# Patient Record
Sex: FEMALE | Race: WHITE | NOT HISPANIC OR LATINO | Employment: UNEMPLOYED | URBAN - NONMETROPOLITAN AREA
[De-identification: names, ages, dates, MRNs, and addresses within clinical notes are randomized per-mention and may not be internally consistent; named-entity substitution may affect disease eponyms.]

---

## 2017-05-04 ENCOUNTER — OFFICE VISIT (OUTPATIENT)
Dept: OBSTETRICS AND GYNECOLOGY | Facility: CLINIC | Age: 32
End: 2017-05-04

## 2017-05-04 VITALS
DIASTOLIC BLOOD PRESSURE: 96 MMHG | SYSTOLIC BLOOD PRESSURE: 138 MMHG | HEIGHT: 62 IN | HEART RATE: 130 BPM | WEIGHT: 172 LBS | BODY MASS INDEX: 31.65 KG/M2

## 2017-05-04 DIAGNOSIS — N91.4 SECONDARY OLIGOMENORRHEA: ICD-10-CM

## 2017-05-04 DIAGNOSIS — N90.4 LICHEN SCLEROSUS ET ATROPHICUS OF THE VULVA: ICD-10-CM

## 2017-05-04 DIAGNOSIS — Z01.419 WELL WOMAN EXAM WITH ROUTINE GYNECOLOGICAL EXAM: Primary | ICD-10-CM

## 2017-05-04 DIAGNOSIS — R10.2 PELVIC PAIN: ICD-10-CM

## 2017-05-04 PROCEDURE — 88312 SPECIAL STAINS GROUP 1: CPT | Performed by: PATHOLOGY

## 2017-05-04 PROCEDURE — 87624 HPV HI-RISK TYP POOLED RSLT: CPT | Performed by: NURSE PRACTITIONER

## 2017-05-04 PROCEDURE — 56605 BIOPSY OF VULVA/PERINEUM: CPT | Performed by: NURSE PRACTITIONER

## 2017-05-04 PROCEDURE — 88312 SPECIAL STAINS GROUP 1: CPT | Performed by: NURSE PRACTITIONER

## 2017-05-04 PROCEDURE — 88305 TISSUE EXAM BY PATHOLOGIST: CPT | Performed by: NURSE PRACTITIONER

## 2017-05-04 PROCEDURE — 88305 TISSUE EXAM BY PATHOLOGIST: CPT | Performed by: PATHOLOGY

## 2017-05-04 PROCEDURE — 88300 SURGICAL PATH GROSS: CPT | Performed by: PATHOLOGY

## 2017-05-04 PROCEDURE — 88142 CYTOPATH C/V THIN LAYER: CPT | Performed by: NURSE PRACTITIONER

## 2017-05-04 PROCEDURE — 99385 PREV VISIT NEW AGE 18-39: CPT | Performed by: NURSE PRACTITIONER

## 2017-05-04 RX ORDER — CLOBETASOL PROPIONATE 0.5 MG/G
OINTMENT TOPICAL 2 TIMES DAILY
Qty: 30 G | Refills: 11 | Status: SHIPPED | OUTPATIENT
Start: 2017-05-04 | End: 2018-08-29 | Stop reason: SDUPTHER

## 2017-05-04 RX ORDER — LISDEXAMFETAMINE DIMESYLATE 60 MG/1
CAPSULE ORAL
Refills: 0 | COMMUNITY
Start: 2017-04-03 | End: 2018-09-08

## 2017-05-04 RX ORDER — BUSPIRONE HYDROCHLORIDE 5 MG/1
TABLET ORAL
Refills: 2 | COMMUNITY
Start: 2017-04-03 | End: 2018-09-08

## 2017-05-05 ENCOUNTER — APPOINTMENT (OUTPATIENT)
Dept: LAB | Facility: HOSPITAL | Age: 32
End: 2017-05-05

## 2017-05-05 LAB
ALBUMIN SERPL-MCNC: 4.8 G/DL (ref 3.4–4.8)
ALBUMIN/GLOB SERPL: 1.5 G/DL (ref 1.1–1.8)
ALP SERPL-CCNC: 80 U/L (ref 38–126)
ALT SERPL W P-5'-P-CCNC: 29 U/L (ref 9–52)
ANION GAP SERPL CALCULATED.3IONS-SCNC: 14 MMOL/L (ref 5–15)
ARTICHOKE IGE QN: 74 MG/DL (ref 1–129)
AST SERPL-CCNC: 23 U/L (ref 14–36)
BILIRUB SERPL-MCNC: 0.8 MG/DL (ref 0.2–1.3)
BUN BLD-MCNC: 12 MG/DL (ref 7–21)
BUN/CREAT SERPL: 17.6 (ref 7–25)
CALCIUM SPEC-SCNC: 9.2 MG/DL (ref 8.4–10.2)
CHLORIDE SERPL-SCNC: 102 MMOL/L (ref 95–110)
CHOLEST SERPL-MCNC: 176 MG/DL (ref 0–199)
CO2 SERPL-SCNC: 23 MMOL/L (ref 22–31)
CREAT BLD-MCNC: 0.68 MG/DL (ref 0.5–1)
DEPRECATED RDW RBC AUTO: 38.7 FL (ref 36.4–46.3)
ERYTHROCYTE [DISTWIDTH] IN BLOOD BY AUTOMATED COUNT: 12.4 % (ref 11.5–14.5)
FSH SERPL-ACNC: 4.6 MIU/ML
GFR SERPL CREATININE-BSD FRML MDRD: 101 ML/MIN/1.73 (ref 64–149)
GLOBULIN UR ELPH-MCNC: 3.3 GM/DL (ref 2.3–3.5)
GLUCOSE BLD-MCNC: 94 MG/DL (ref 60–100)
HBA1C MFR BLD: 5.04 % (ref 4–5.6)
HCT VFR BLD AUTO: 40.9 % (ref 35–45)
HDLC SERPL-MCNC: 35 MG/DL (ref 60–200)
HGB BLD-MCNC: 14.7 G/DL (ref 12–15.5)
LDLC/HDLC SERPL: ABNORMAL {RATIO} (ref 0–3.22)
LH SERPL-ACNC: 8.34 MIU/ML
MCH RBC QN AUTO: 30.9 PG (ref 26.5–34)
MCHC RBC AUTO-ENTMCNC: 35.9 G/DL (ref 31.4–36)
MCV RBC AUTO: 85.9 FL (ref 80–98)
PLATELET # BLD AUTO: 226 10*3/MM3 (ref 150–450)
PMV BLD AUTO: 10.4 FL (ref 8–12)
POTASSIUM BLD-SCNC: 4 MMOL/L (ref 3.5–5.1)
PROT SERPL-MCNC: 8.1 G/DL (ref 6.3–8.6)
RBC # BLD AUTO: 4.76 10*6/MM3 (ref 3.77–5.16)
SODIUM BLD-SCNC: 139 MMOL/L (ref 137–145)
T3 SERPL-MCNC: 131 NG/DL (ref 97–169)
T4 FREE SERPL-MCNC: 0.83 NG/DL (ref 0.78–2.19)
TRIGL SERPL-MCNC: 521 MG/DL (ref 20–199)
TSH SERPL DL<=0.05 MIU/L-ACNC: 3.3 MIU/ML (ref 0.46–4.68)
WBC NRBC COR # BLD: 5.21 10*3/MM3 (ref 3.2–9.8)

## 2017-05-05 PROCEDURE — 80053 COMPREHEN METABOLIC PANEL: CPT | Performed by: NURSE PRACTITIONER

## 2017-05-05 PROCEDURE — 85027 COMPLETE CBC AUTOMATED: CPT | Performed by: NURSE PRACTITIONER

## 2017-05-05 PROCEDURE — 36415 COLL VENOUS BLD VENIPUNCTURE: CPT | Performed by: NURSE PRACTITIONER

## 2017-05-05 PROCEDURE — 83525 ASSAY OF INSULIN: CPT | Performed by: NURSE PRACTITIONER

## 2017-05-05 PROCEDURE — 84480 ASSAY TRIIODOTHYRONINE (T3): CPT | Performed by: NURSE PRACTITIONER

## 2017-05-05 PROCEDURE — 84443 ASSAY THYROID STIM HORMONE: CPT | Performed by: NURSE PRACTITIONER

## 2017-05-05 PROCEDURE — 84146 ASSAY OF PROLACTIN: CPT | Performed by: NURSE PRACTITIONER

## 2017-05-05 PROCEDURE — 83002 ASSAY OF GONADOTROPIN (LH): CPT | Performed by: NURSE PRACTITIONER

## 2017-05-05 PROCEDURE — 83036 HEMOGLOBIN GLYCOSYLATED A1C: CPT | Performed by: NURSE PRACTITIONER

## 2017-05-05 PROCEDURE — 83001 ASSAY OF GONADOTROPIN (FSH): CPT | Performed by: NURSE PRACTITIONER

## 2017-05-05 PROCEDURE — 82670 ASSAY OF TOTAL ESTRADIOL: CPT | Performed by: NURSE PRACTITIONER

## 2017-05-05 PROCEDURE — 80061 LIPID PANEL: CPT | Performed by: NURSE PRACTITIONER

## 2017-05-05 PROCEDURE — 84439 ASSAY OF FREE THYROXINE: CPT | Performed by: NURSE PRACTITIONER

## 2017-05-05 PROCEDURE — 84144 ASSAY OF PROGESTERONE: CPT | Performed by: NURSE PRACTITIONER

## 2017-05-06 LAB
ESTRADIOL SERPL HS-MCNC: 80.6 PG/ML
INSULIN SERPL-ACNC: 28.9 UIU/ML (ref 2.6–24.9)
PROGEST SERPL-MCNC: <0.1 NG/ML
PROLACTIN SERPL-MCNC: 15.3 NG/ML (ref 4.8–23.3)

## 2017-05-08 ENCOUNTER — TELEPHONE (OUTPATIENT)
Dept: OBSTETRICS AND GYNECOLOGY | Facility: CLINIC | Age: 32
End: 2017-05-08

## 2017-05-08 LAB
LAB AP CASE REPORT: NORMAL
LAB AP CLINICAL INFORMATION: NORMAL
Lab: NORMAL
Lab: NORMAL
PATH REPORT.FINAL DX SPEC: NORMAL

## 2017-05-08 RX ORDER — MEDROXYPROGESTERONE ACETATE 10 MG/1
10 TABLET ORAL DAILY
Qty: 10 TABLET | Refills: 11 | Status: SHIPPED | OUTPATIENT
Start: 2017-05-08 | End: 2018-08-29 | Stop reason: SDUPTHER

## 2017-05-08 RX ORDER — METFORMIN HYDROCHLORIDE 500 MG/1
500 TABLET, EXTENDED RELEASE ORAL
Qty: 30 TABLET | Refills: 12 | Status: SHIPPED | OUTPATIENT
Start: 2017-05-08 | End: 2018-08-29 | Stop reason: SDUPTHER

## 2017-05-09 ENCOUNTER — TELEPHONE (OUTPATIENT)
Dept: OBSTETRICS AND GYNECOLOGY | Facility: CLINIC | Age: 32
End: 2017-05-09

## 2017-05-10 LAB
LAB AP CASE REPORT: NORMAL
LAB AP GYN ADDITIONAL INFORMATION: NORMAL
Lab: NORMAL
PATH INTERP SPEC-IMP: NORMAL
STAT OF ADQ CVX/VAG CYTO-IMP: NORMAL

## 2017-05-11 ENCOUNTER — TELEPHONE (OUTPATIENT)
Dept: OBSTETRICS AND GYNECOLOGY | Facility: CLINIC | Age: 32
End: 2017-05-11

## 2017-05-12 LAB — HPV I/H RISK 4 DNA CVX QL PROBE+SIG AMP: NEGATIVE

## 2018-06-18 RX ORDER — CLOBETASOL PROPIONATE 0.5 MG/G
OINTMENT TOPICAL
Qty: 30 G | Refills: 11 | OUTPATIENT
Start: 2018-06-18

## 2018-08-29 ENCOUNTER — OFFICE VISIT (OUTPATIENT)
Dept: OBSTETRICS AND GYNECOLOGY | Facility: CLINIC | Age: 33
End: 2018-08-29

## 2018-08-29 VITALS
WEIGHT: 172 LBS | DIASTOLIC BLOOD PRESSURE: 92 MMHG | BODY MASS INDEX: 31.65 KG/M2 | HEART RATE: 92 BPM | SYSTOLIC BLOOD PRESSURE: 120 MMHG | HEIGHT: 62 IN

## 2018-08-29 DIAGNOSIS — E88.81 INSULIN RESISTANCE: ICD-10-CM

## 2018-08-29 DIAGNOSIS — N91.4 SECONDARY OLIGOMENORRHEA: ICD-10-CM

## 2018-08-29 DIAGNOSIS — N90.4 LICHEN SCLEROSUS ET ATROPHICUS OF THE VULVA: Primary | ICD-10-CM

## 2018-08-29 DIAGNOSIS — E28.2 PCOS (POLYCYSTIC OVARIAN SYNDROME): ICD-10-CM

## 2018-08-29 PROCEDURE — 99214 OFFICE O/P EST MOD 30 MIN: CPT | Performed by: NURSE PRACTITIONER

## 2018-08-29 RX ORDER — MEDROXYPROGESTERONE ACETATE 10 MG/1
10 TABLET ORAL DAILY
Qty: 10 TABLET | Refills: 11 | Status: SHIPPED | OUTPATIENT
Start: 2018-08-29 | End: 2018-09-08

## 2018-08-29 RX ORDER — CLOBETASOL PROPIONATE 0.5 MG/G
OINTMENT TOPICAL
Qty: 30 G | Refills: 11 | Status: SHIPPED | OUTPATIENT
Start: 2018-08-29 | End: 2018-09-08

## 2018-08-29 RX ORDER — METFORMIN HYDROCHLORIDE 500 MG/1
500 TABLET, EXTENDED RELEASE ORAL
Qty: 30 TABLET | Refills: 12 | Status: SHIPPED | OUTPATIENT
Start: 2018-08-29 | End: 2021-01-19

## 2018-08-29 NOTE — PROGRESS NOTES
Subjective   Chanelle Blanc is a 32 y.o. female. Needs refills on clobetasol and metformin but stopped taking metformin a few months ago b/c she didn't understand why she was taking it to begin with.    LMP- August, prior to that she hadn't had a period since May. She cramps really bad for several weeks prior to starting a period but reports that the bleeding is normal flow for about 7 days.     Using clobetasol for LS every 4-5 days and reports no itching but still will experience occasional fissures.     Took metformin until Rx ran out and didn't worry about refilling it because she didn't understand it's purpose and wonders about that today.    Last pap- 17 normal with negative HPV      Gynecologic Exam   The patient's pertinent negatives include no genital itching, genital lesions, genital odor, genital rash, missed menses, pelvic pain, vaginal bleeding or vaginal discharge. Primary symptoms comment: Lichen Sclerosus. This is a chronic problem. The current episode started more than 1 year ago. The problem occurs daily. The problem has been resolved. The patient is experiencing no pain. The problem affects both sides. Associated symptoms include diarrhea. Pertinent negatives include no abdominal pain, dysuria, headaches, nausea, painful intercourse or vomiting. The symptoms are aggravated by intercourse (occasionally still has pain with intercourse ). Treatments tried: steroid cream. The treatment provided significant relief. She is sexually active. No, her partner does not have an STD. She uses tubal ligation for contraception. Her menstrual history has been irregular. Her past medical history is significant for a gynecological surgery and miscarriage. There is no history of an abdominal surgery, a  section, an ectopic pregnancy, endometriosis, herpes simplex, menorrhagia, metrorrhagia, ovarian cysts, perineal abscess, PID, an STD, a terminated pregnancy or vaginosis.       The following portions of  the patient's history were reviewed and updated as appropriate: allergies, current medications, past medical history, past social history, past surgical history and problem list.    Review of Systems   Constitutional: Negative for activity change, appetite change, diaphoresis, fatigue and unexpected weight change.   Respiratory: Negative for chest tightness and shortness of breath.    Cardiovascular: Negative for chest pain and palpitations.   Gastrointestinal: Positive for blood in stool and diarrhea. Negative for abdominal distention, abdominal pain, nausea and vomiting.   Genitourinary: Positive for menstrual problem. Negative for difficulty urinating, dyspareunia, dysuria, genital sores, menorrhagia, missed menses, pelvic pain, vaginal bleeding, vaginal discharge and vaginal pain.   Musculoskeletal: Negative for myalgias.   Neurological: Negative for headaches.       Objective   Physical Exam   Constitutional: She is oriented to person, place, and time. She appears well-developed and well-nourished. No distress.   Cardiovascular: Normal rate, regular rhythm and normal heart sounds.    Pulmonary/Chest: Effort normal and breath sounds normal.   Abdominal: Soft. Bowel sounds are normal. She exhibits no distension. There is no tenderness.   Genitourinary: No labial fusion. There is lesion on the right labia. There is no rash, tenderness or injury on the right labia. There is lesion on the left labia. There is no rash, tenderness or injury on the left labia.   Genitourinary Comments: Slight loss of architecture from 10-00 clockwise around the clitoris to 4-00. The skin is a pale pink-white, shiny and thin. The is a vertical fissure in the labial folds on either side of the vaginal os. No color or texture changes around the posterior fourchette. Improvement in this area is very noticeable.    Lymphadenopathy:        Right: No inguinal adenopathy present.        Left: No inguinal adenopathy present.   Neurological: She  is alert and oriented to person, place, and time.   Skin: Skin is warm and dry. She is not diaphoretic.   Psychiatric: She has a normal mood and affect. Her behavior is normal.   Nursing note and vitals reviewed.      Assessment/Plan   Chanelle was seen today for gynecologic exam.    Diagnoses and all orders for this visit:    Lichen sclerosus et atrophicus of the vulva    Secondary oligomenorrhea    Insulin resistance    PCOS (polycystic ovarian syndrome)    Other orders  -     clobetasol (TEMOVATE) 0.05 % ointment; Apply to the affected area up to once daily as directed.  -     medroxyPROGESTERone (PROVERA) 10 MG tablet; Take 1 tablet by mouth Daily.  -     metFORMIN ER (GLUCOPHAGE XR) 500 MG 24 hr tablet; Take 1 tablet by mouth Daily With Breakfast.       Insulin resistance and oligomenorrhea together indicate PCOS. We discussed this diagnosis in great length including, treatment, dietary changes, activity changes and potential co-morbidities if not treated appropriately. She will restart metformin and verbalized an understanding of it's purpose, R/B/A and potential s/e. Continue clobetasol for LS indefinitely. She will increase the frequency back up to once daily for the next 2-3 weeks then will start to taper back down to once per week or less if tolerated; the tissue is not fully healed and she may benefit from increased use for a short period of time. R/B/A and potential s/e of clobetasol reviewed. F/U in 1 year or sooner, PRN.

## 2019-04-18 ENCOUNTER — LAB (OUTPATIENT)
Dept: LAB | Facility: HOSPITAL | Age: 34
End: 2019-04-18

## 2019-04-18 ENCOUNTER — OFFICE VISIT (OUTPATIENT)
Dept: OBSTETRICS AND GYNECOLOGY | Facility: CLINIC | Age: 34
End: 2019-04-18

## 2019-04-18 VITALS
DIASTOLIC BLOOD PRESSURE: 88 MMHG | SYSTOLIC BLOOD PRESSURE: 128 MMHG | HEIGHT: 62 IN | WEIGHT: 185.4 LBS | BODY MASS INDEX: 34.12 KG/M2

## 2019-04-18 DIAGNOSIS — N64.4 PAIN OF RIGHT BREAST: Primary | ICD-10-CM

## 2019-04-18 DIAGNOSIS — R53.83 FATIGUE, UNSPECIFIED TYPE: ICD-10-CM

## 2019-04-18 DIAGNOSIS — N64.52 DISCHARGE FROM RIGHT NIPPLE: ICD-10-CM

## 2019-04-18 DIAGNOSIS — N64.4 PAIN OF RIGHT BREAST: ICD-10-CM

## 2019-04-18 LAB
B-HCG UR QL: NEGATIVE
DEPRECATED RDW RBC AUTO: 37.6 FL (ref 37–54)
ERYTHROCYTE [DISTWIDTH] IN BLOOD BY AUTOMATED COUNT: 12.1 % (ref 12.3–15.4)
HCT VFR BLD AUTO: 39.4 % (ref 34–46.6)
HGB BLD-MCNC: 14.2 G/DL (ref 12–15.9)
INTERNAL NEGATIVE CONTROL: NEGATIVE
INTERNAL POSITIVE CONTROL: POSITIVE
Lab: NORMAL
MCH RBC QN AUTO: 31.1 PG (ref 26.6–33)
MCHC RBC AUTO-ENTMCNC: 36 G/DL (ref 31.5–35.7)
MCV RBC AUTO: 86.2 FL (ref 79–97)
PLATELET # BLD AUTO: 264 10*3/MM3 (ref 140–450)
PMV BLD AUTO: 12 FL (ref 6–12)
PROLACTIN SERPL-MCNC: 34.8 NG/ML (ref 4.79–23.3)
RBC # BLD AUTO: 4.57 10*6/MM3 (ref 3.77–5.28)
T4 FREE SERPL-MCNC: 0.98 NG/DL (ref 0.93–1.7)
TSH SERPL DL<=0.05 MIU/L-ACNC: 3.41 MIU/ML (ref 0.27–4.2)
WBC NRBC COR # BLD: 4.32 10*3/MM3 (ref 3.4–10.8)

## 2019-04-18 PROCEDURE — 81025 URINE PREGNANCY TEST: CPT | Performed by: NURSE PRACTITIONER

## 2019-04-18 PROCEDURE — 84439 ASSAY OF FREE THYROXINE: CPT | Performed by: NURSE PRACTITIONER

## 2019-04-18 PROCEDURE — 84146 ASSAY OF PROLACTIN: CPT | Performed by: NURSE PRACTITIONER

## 2019-04-18 PROCEDURE — 84443 ASSAY THYROID STIM HORMONE: CPT | Performed by: NURSE PRACTITIONER

## 2019-04-18 PROCEDURE — 85027 COMPLETE CBC AUTOMATED: CPT

## 2019-04-18 PROCEDURE — 99213 OFFICE O/P EST LOW 20 MIN: CPT | Performed by: NURSE PRACTITIONER

## 2019-04-18 PROCEDURE — 36415 COLL VENOUS BLD VENIPUNCTURE: CPT | Performed by: NURSE PRACTITIONER

## 2019-04-18 NOTE — PROGRESS NOTES
Subjective   Chanelle Blanc is a 33 y.o. right breast problem    LMP: 04/10/19    PMH: PCOS, lichen sclerosus, pelvic pain.  SxHx: None.      Pt reports 4 months ago went to urgent care with c/o softball size mass under right armpit, she was diagnosed with right axilla lymphadenopathy and treated with antibiotic.  After completing antibiotic her symptoms resolved.  Then 2 months ago, she started having pain (rates as VAS 4/10) and burning in right breast and intermittent nipple discharge.  The nipple discharge is a yellow-gold color and occurred randomly x 4 episodes.  She denies excessive touching or breast trauma.      Water- drinks 3-4 bottles of water/day  Caffeine: coffee 1 cup/day  Stress- no  Smoke- no   Family hx of breast cancer: PGM: dx in 40s or 50s lived for several years, MGM: dx in 40s or 50s, tx without sx and lived many years        Breast Problem   This is a new problem. The current episode started more than 1 month ago (2 months ). The problem occurs intermittently. The problem has been unchanged. Associated symptoms include fatigue and headaches. Pertinent negatives include no abdominal pain, anorexia, arthralgias, change in bowel habit, chest pain, chills, congestion, coughing, diaphoresis, fever, joint swelling, myalgias, nausea, neck pain, numbness, rash, sore throat, swollen glands, urinary symptoms, vertigo, visual change, vomiting or weakness. Nothing aggravates the symptoms. She has tried acetaminophen and NSAIDs for the symptoms. The treatment provided no relief.       The following portions of the patient's history were reviewed and updated as appropriate: allergies, current medications, past family history, past medical history, past social history, past surgical history and problem list.    Review of Systems   Constitutional: Positive for fatigue. Negative for appetite change, chills, diaphoresis, fever and unexpected weight change.   HENT: Negative for congestion and sore throat.     Respiratory: Negative for apnea, cough and shortness of breath.    Cardiovascular: Negative for chest pain and palpitations.   Gastrointestinal: Negative for abdominal pain, anorexia, change in bowel habit, constipation, diarrhea, nausea and vomiting.   Genitourinary: Negative for dyspareunia, dysuria, flank pain, menstrual problem, pelvic pain, urgency, vaginal bleeding, vaginal discharge and vaginal pain.   Musculoskeletal: Negative for arthralgias, joint swelling, myalgias and neck pain.   Skin: Negative for rash.   Neurological: Positive for headaches. Negative for vertigo, weakness and numbness.   Hematological: Does not bruise/bleed easily.   Psychiatric/Behavioral: Negative for sleep disturbance.       Objective   Physical Exam   Constitutional: She is oriented to person, place, and time. Vital signs are normal. She appears well-developed and well-nourished. No distress.   Cardiovascular: Normal rate, regular rhythm and normal heart sounds.   Pulmonary/Chest: Effort normal and breath sounds normal. Right breast exhibits tenderness. Right breast exhibits no inverted nipple, no mass, no nipple discharge and no skin change. Left breast exhibits no inverted nipple, no mass, no nipple discharge, no skin change and no tenderness. Breasts are symmetrical.   Pt reports tenderness upon palpation of right breast. I did not palpate any abnormalities.     Genitourinary: No breast bleeding.   Neurological: She is alert and oriented to person, place, and time. GCS eye subscore is 4. GCS verbal subscore is 5. GCS motor subscore is 6.   Skin: Skin is warm and dry. She is not diaphoretic.   Psychiatric: She has a normal mood and affect. Her behavior is normal.   Nursing note and vitals reviewed.        Assessment/Plan   Chanelle was seen today for breast pain.    Diagnoses and all orders for this visit:    Pain of right breast  -     Prolactin  -     CBC (No Diff); Future    Discharge from right nipple  -     TSH  -     T4,  Free  -     Cancel: POC Pregnancy, Urine  -     Prolactin  -     POC Pregnancy, Urine    Fatigue, unspecified type  -     CBC (No Diff); Future  -     POC Pregnancy, Urine             Pt educated to wear proper fitting bra, avoid cigarettes, caffeine and stress, may take ibuprofen prn.  Labs today to r/o infectious process and cause of breast pain and nipple discharge.  We may do right breast u/s pending lab results.  Pt agrees to plan of care.

## 2019-04-19 DIAGNOSIS — R79.89 PROLACTIN INCREASED: Primary | ICD-10-CM

## 2020-07-21 ENCOUNTER — TRANSCRIBE ORDERS (OUTPATIENT)
Dept: ORTHOPEDIC SURGERY | Facility: CLINIC | Age: 35
End: 2020-07-21

## 2020-07-21 DIAGNOSIS — G56.03 BILATERAL CARPAL TUNNEL SYNDROME: Primary | ICD-10-CM

## 2020-07-23 ENCOUNTER — OFFICE VISIT (OUTPATIENT)
Dept: ORTHOPEDIC SURGERY | Facility: CLINIC | Age: 35
End: 2020-07-23

## 2020-07-23 VITALS
HEIGHT: 62 IN | HEART RATE: 95 BPM | RESPIRATION RATE: 18 BRPM | OXYGEN SATURATION: 98 % | BODY MASS INDEX: 33.31 KG/M2 | SYSTOLIC BLOOD PRESSURE: 119 MMHG | TEMPERATURE: 99 F | DIASTOLIC BLOOD PRESSURE: 86 MMHG | WEIGHT: 181 LBS

## 2020-07-23 DIAGNOSIS — M79.641 PAIN OF RIGHT HAND: Primary | ICD-10-CM

## 2020-07-23 PROCEDURE — 20605 DRAIN/INJ JOINT/BURSA W/O US: CPT | Performed by: ORTHOPAEDIC SURGERY

## 2020-07-23 PROCEDURE — 99203 OFFICE O/P NEW LOW 30 MIN: CPT | Performed by: ORTHOPAEDIC SURGERY

## 2020-07-23 RX ORDER — LIDOCAINE HYDROCHLORIDE 10 MG/ML
1 INJECTION, SOLUTION EPIDURAL; INFILTRATION; INTRACAUDAL; PERINEURAL
Status: COMPLETED | OUTPATIENT
Start: 2020-07-23 | End: 2020-07-23

## 2020-07-23 RX ORDER — TRIAMCINOLONE ACETONIDE 40 MG/ML
40 INJECTION, SUSPENSION INTRA-ARTICULAR; INTRAMUSCULAR
Status: COMPLETED | OUTPATIENT
Start: 2020-07-23 | End: 2020-07-23

## 2020-07-23 RX ADMIN — LIDOCAINE HYDROCHLORIDE 1 ML: 10 INJECTION, SOLUTION EPIDURAL; INFILTRATION; INTRACAUDAL; PERINEURAL at 15:27

## 2020-07-23 RX ADMIN — TRIAMCINOLONE ACETONIDE 40 MG: 40 INJECTION, SUSPENSION INTRA-ARTICULAR; INTRAMUSCULAR at 15:27

## 2020-07-23 NOTE — PROGRESS NOTES
"Chanelle Blanc is a 34 y.o. female   Primary provider:  Cinthia Martin APRN       Chief Complaint   Patient presents with   • Right Hand - Pain, Initial Evaluation       HISTORY OF PRESENT ILLNESS: This is the first office visit for evaluation of numbness and twitching in the right hand.    Mrs. Howell is 34 years old and right-hand dominant.  She said about 2 years ago she began having some tingling in her right hand and had nerve conduction studies and was told she had mild carpal tunnel syndrome.  At the time she was having some pain but this has resolved.  Her primary complaint is that of intermittent twitching in the hand.  She points to the first dorsal interosseous muscle as the site of her twitching.  This is intermittent and there is no specific aggravating or relieving factor.  She admits that her hand \" falls asleep easily \" including all fingers.  Her symptoms are worse at night and worse with prolonged flexion of the wrist.  Treatment has included bracing which has given her no benefit.    Home medications include albuterol Mucinex metformin and Topamax.  She has a history of polycystic ovarian disease.  She is otherwise healthy.  She does not smoke.  She has no drug allergies.  She is employed in retail.  She is .    RENATA Junior has asked that I see her for evaluation and treatment.        Pain   This is a new problem. The current episode started more than 1 month ago (3 months). The problem occurs intermittently (mild). The problem has been unchanged. Associated symptoms comments: Mild pain between index and thumb on right hand. Nothing aggravates the symptoms. She has tried nothing for the symptoms. The treatment provided no relief.        CONCURRENT MEDICAL HISTORY:    History reviewed. No pertinent past medical history.    Allergies   Allergen Reactions   • Lortab [Hydrocodone-Acetaminophen] Nausea Only and Other (See Comments)     headaches         Current Outpatient " "Medications:   •  albuterol sulfate  (90 Base) MCG/ACT inhaler, 2 puffs morning, noon,4 o'clock and at bedtime for one week,then 4 times daily as needed., Disp: 18 g, Rfl: 0  •  azithromycin (ZITHROMAX) 250 MG tablet, Take 2 tablets the first day, then 1 tablet daily for 4 days., Disp: 6 tablet, Rfl: 0  •  guaiFENesin (MUCINEX) 600 MG 12 hr tablet, Take 1 tablet by mouth 2 (Two) Times a Day., Disp: 18 tablet, Rfl: 0  •  metFORMIN ER (GLUCOPHAGE XR) 500 MG 24 hr tablet, Take 1 tablet by mouth Daily With Breakfast. (Patient taking differently: Take 1,000 mg by mouth Daily With Breakfast.), Disp: 30 tablet, Rfl: 12  •  topiramate (TOPAMAX) 100 MG tablet, TAKE 1 TABLET (100 MG) BY MOUTH 2 TIMES DAILY, Disp: , Rfl: 2    No past surgical history on file.    History reviewed. No pertinent family history.     Social History     Socioeconomic History   • Marital status:      Spouse name: Not on file   • Number of children: Not on file   • Years of education: Not on file   • Highest education level: Not on file   Tobacco Use   • Smoking status: Former Smoker   • Smokeless tobacco: Never Used   Substance and Sexual Activity   • Alcohol use: No   • Drug use: No   • Sexual activity: Yes     Partners: Male     Birth control/protection: Surgical        Review of Systems   Musculoskeletal:        Right hand pain     Review of systems is positive as noted above.  PHYSICAL EXAMINATION:       Vitals:    07/23/20 1454   BP: 119/86   BP Location: Right arm   Patient Position: Sitting   Cuff Size: Adult   Pulse: 95   Resp: 18   Temp: 99 °F (37.2 °C)   TempSrc: Temporal   SpO2: 98%   Weight: 82.1 kg (181 lb)   Height: 157.5 cm (62\")   PainSc: 0-No pain       Physical Exam she is alert pleasant and in no apparent distress.  She responds appropriately to questions and commands.    GAIT:     [x]  Normal  []  Antalgic    Assistive device: [x]  None  []  Walker     []  Crutches  []  Cane     []  Wheelchair  []  Stretcher    Ortho " Exam is directed to the upper extremities.  There is no intrinsic atrophy in either hand.  Digital wrist and forearm motions are full smooth and painless.  There is no synovitis of the flexor compartment of the right forearm.  Sensory exam reveals hypaesthesia to soft touch in the right thumb index and middle fingers.  Sensation in the ring finger is normal.  There is hypaesthesia over the dorsal radial aspect of the hand.  There is no thenar atrophy or softening.  Strength of abduction of the small finger is normal.  Tinel sign is absent over the ulnar nerve at the elbow.  Pressure Phalen testing produces mild tingling at the wrist but no digital symptoms.      No results found.        ASSESSMENT: Probable carpal tunnel syndrome on the right.  It is unclear why she is having the symptoms of twitching but I think is likely related to her median nerve compression.    The natural history of the disorder and treatment options were reviewed.  At this point I see no surgical indications.  She was given the [employer] website to further educate herself.    Potential benefits of injection therapy were discussed.  She wished to proceed with this.    The right wrist was prepped.  Skin was infiltrated with 1% Xylocaine with epinephrine.  The flexor compartment of the distal forearm was then injected with a mixture of Xylocaine and Kenalog.  There were no complications.    If her symptoms do not respond to the above measures she will call for reevaluation.    Diagnoses and all orders for this visit:    Pain of right hand  -     Medium Joint Arthrocentesis: R radiocarpal      Medium Joint Arthrocentesis: R radiocarpal  Date/Time: 7/23/2020 3:27 PM  Consent given by: patient  Site marked: site marked  Timeout: Immediately prior to procedure a time out was called to verify the correct patient, procedure, equipment, support staff and site/side marked as required   Supporting Documentation  Indications: pain   Procedure  Details  Location: wrist - R radiocarpal  Needle size: 25 G  Medications administered: 40 mg triamcinolone acetonide 40 MG/ML; 1 mL lidocaine PF 1% 1 %  Patient tolerance: patient tolerated the procedure well with no immediate complications          PLAN      Patient's Body mass index is 33.11 kg/m². BMI is above normal parameters. Recommendations include: exercise counseling and nutrition counseling.    Return if symptoms worsen or fail to improve.    Asif Forrester MD

## 2021-01-18 DIAGNOSIS — M79.642 LEFT HAND PAIN: Primary | ICD-10-CM

## 2021-01-19 ENCOUNTER — OFFICE VISIT (OUTPATIENT)
Dept: ORTHOPEDIC SURGERY | Facility: CLINIC | Age: 36
End: 2021-01-19

## 2021-01-19 VITALS — WEIGHT: 173.3 LBS | BODY MASS INDEX: 31.89 KG/M2 | HEIGHT: 62 IN

## 2021-01-19 DIAGNOSIS — R20.0 NUMBNESS OF LEFT THUMB: Primary | ICD-10-CM

## 2021-01-19 PROCEDURE — 99214 OFFICE O/P EST MOD 30 MIN: CPT | Performed by: NURSE PRACTITIONER

## 2021-01-19 RX ORDER — METHYLPREDNISOLONE 4 MG/1
TABLET ORAL
Qty: 21 TABLET | Refills: 0 | Status: SHIPPED | OUTPATIENT
Start: 2021-01-19

## 2021-01-19 NOTE — PROGRESS NOTES
Chanelle Blanc is a 35 y.o. female   Primary provider:  Cinthia Martin APRN       Chief Complaint   Patient presents with   • Left Hand - Pain   • Establish Care       HISTORY OF PRESENT ILLNESS: Patient is a 35-year-old female who presents today with complaints of left thumb numbness that started approximately 4 days ago.  Patient reports that 4 days ago she was at work and she was resting her hand on a table, she states that she noticed a burning sensation in her thumb and when she looked down the vein in her left thumb it was bulging.  She reports then she developed a stinging feeling with palpation of her thumb, however this is resolved.  Patient reports that she is having no pain in her thumb however she continues to have a constant numbness on one half of her thumb.  She denies symptoms being worse at night.  Symptoms are not worse with driving or reading.  She denies tingling, burning.  She denies pain.  No other fingers are affected.  She denies injury or precipitating factors.  She has not tried anything for symptoms.    Pain  This is a new problem. The current episode started in the past 7 days. Associated symptoms comments: burning.        CONCURRENT MEDICAL HISTORY:    History reviewed. No pertinent past medical history.    Allergies   Allergen Reactions   • Lortab [Hydrocodone-Acetaminophen] Nausea Only and Other (See Comments)     headaches         Current Outpatient Medications:   •  albuterol sulfate  (90 Base) MCG/ACT inhaler, 2 puffs morning, noon,4 o'clock and at bedtime for one week,then 4 times daily as needed., Disp: 18 g, Rfl: 0  •  methylPREDNISolone (MEDROL) 4 MG dose pack, Use as directed by package instructions, Disp: 21 tablet, Rfl: 0    History reviewed. No pertinent surgical history.    History reviewed. No pertinent family history.     Social History     Socioeconomic History   • Marital status:      Spouse name: Not on file   • Number of children: Not on file   • Years  "of education: Not on file   • Highest education level: Not on file   Tobacco Use   • Smoking status: Former Smoker   • Smokeless tobacco: Never Used   Substance and Sexual Activity   • Alcohol use: Yes     Comment: social   • Drug use: No   • Sexual activity: Yes     Partners: Male     Birth control/protection: Surgical        Review of Systems   Constitutional: Negative.    HENT: Negative.    Eyes: Negative.    Respiratory: Negative.    Cardiovascular: Negative.    Gastrointestinal: Negative.    Endocrine: Negative.    Genitourinary: Negative.    Musculoskeletal: Negative.         Left thumb numbness    Skin: Negative.    Allergic/Immunologic: Negative.    Neurological: Negative.    Hematological: Negative.    Psychiatric/Behavioral: Negative.        PHYSICAL EXAMINATION:       Ht 157.5 cm (62\")   Wt 78.6 kg (173 lb 4.8 oz)   BMI 31.70 kg/m²     Physical Exam  Vitals signs and nursing note reviewed.   Constitutional:       General: She is not in acute distress.     Appearance: She is well-developed. She is not toxic-appearing.   HENT:      Head: Normocephalic.   Pulmonary:      Effort: Pulmonary effort is normal. No respiratory distress.   Skin:     General: Skin is warm and dry.   Neurological:      Mental Status: She is alert and oriented to person, place, and time.   Psychiatric:         Behavior: Behavior normal.         Thought Content: Thought content normal.         Judgment: Judgment normal.         GAIT:     [x]  Normal  []  Antalgic    Assistive device: [x]  None  []  Walker     []  Crutches  []  Cane     []  Wheelchair  []  Stretcher    Left Hand Exam     Tenderness   The patient is experiencing no tenderness.     Range of Motion   The patient has normal left wrist ROM.    Tests   Phalen’s Sign: negative  Tinel's sign (median nerve): negative  Finkelstein's test: negative    Other   Erythema: absent  Sensation: normal  Pulse: present    Comments:  Patient reports numb sensation on ulnar side of left " thumb  No bony tenderness  No other fingers affected  Fingers are warm, pink, with good capillary refill  No malrotation or deformity              Xr Hand 3+ View Left    Result Date: 1/20/2021  Narrative: Study: XR hand 3 + VW, left Comparison: 9/8/2018 Narrative: Hand is acceptable in alignment and position.  Joint spaces of hand are maintained.  No evidence of fracture or dislocation.  Essentially no interval change from previous imaging on 9/8/2018. Debora Leung APRN 1/20/2021          ASSESSMENT:    Diagnoses and all orders for this visit:    Numbness of left thumb    Other orders  -     methylPREDNISolone (MEDROL) 4 MG dose pack; Use as directed by package instructions          PLAN      Patient's numbness only occurs on ulnar side of left thumb.  Range of motion is normal.  Symptoms cannot be worsened or elicited with Phalen's or Tinel's sign.  Patient does work at [employer] and reports that she does a lot of repetitive motions with her hand.  Patient does have some tenderness with palpation of extensor tendon over proximal phalanx of thumb.  Symptoms are vague and does not point to any specific pathology.  Patient given thumb spica and Medrol Dosepak.  Patient instructed to proceed with rice therapy and activity modification.  Patient to return in 1 to 2 weeks for recheck.  Patient denies possibility of pregnancy.      Return in about 2 weeks (around 2/2/2021).    Debora Leung, APRN

## 2021-06-17 ENCOUNTER — OFFICE VISIT (OUTPATIENT)
Dept: ORTHOPEDIC SURGERY | Facility: CLINIC | Age: 36
End: 2021-06-17

## 2021-06-17 VITALS — OXYGEN SATURATION: 97 % | HEIGHT: 62 IN | BODY MASS INDEX: 31.83 KG/M2 | HEART RATE: 105 BPM | WEIGHT: 173 LBS

## 2021-06-17 DIAGNOSIS — M25.532 WRIST PAIN, ACUTE, LEFT: ICD-10-CM

## 2021-06-17 DIAGNOSIS — R20.0 NUMBNESS OF LEFT THUMB: Primary | ICD-10-CM

## 2021-06-17 DIAGNOSIS — M79.642 LEFT HAND PAIN: ICD-10-CM

## 2021-06-17 PROCEDURE — 99213 OFFICE O/P EST LOW 20 MIN: CPT | Performed by: ORTHOPAEDIC SURGERY

## 2021-06-17 NOTE — PROGRESS NOTES
"Chanelle Blanc is a 35 y.o. female returns for     Chief Complaint   Patient presents with   • Left Hand - Follow-up, Pain       HISTORY OF PRESENT ILLNESS:Continued pain in left hand .  Pain today 7/10    Mrs. Blanc had a good response to her right carpal tunnel injection last year.  She was seen in January of this year by Mrs. Hanson for acute onset of left thumb numbness which has resolved.  She was doing well until 3 days ago when she had spontaneous onset of pain over the ulnar aspect of the wrist.  She is considered to be a fair historian but there is been no history of trauma.  She had questionable similar symptoms 8 years ago or so.  The pain is well localized to the ulnar aspect of the wrist and has been relieved somewhat by bracing and rest.  She said when a nurse at her work pushed on the ulnar aspect of her wrist she has some numbness extending into the small finger but otherwise has had no numbness or tingling.       CONCURRENT MEDICAL HISTORY:    The following portions of the patient's history were reviewed and updated as appropriate: allergies, current medications, past family history, past medical history, past social history, past surgical history and problem list.         PHYSICAL EXAMINATION:       Pulse 105   Ht 157.5 cm (62\")   Wt 78.5 kg (173 lb)   SpO2 97%   BMI 31.64 kg/m²     Physical Exam she is alert and in no apparent distress.    GAIT:     [x]  Normal  []  Antalgic    Assistive device: [x]  None  []  Walker     []  Crutches  []  Cane     []  Wheelchair  []  Stretcher    Ortho Exam is directed to the left upper extremity.  There is no visible abnormality.  Digital motions are full.  Active flexion and extension of the wrist are each painfully restricted to about 30 degrees.  There is tenderness fairly discretely over the flexor carpi ulnaris tendon sheath in the distal forearm.  Sensory exam is intact to soft touch.      No results found.          ASSESSMENT: Left wrist pain of " uncertain cause.  She has limited clinical findings.  Her exam is most consistent with flexor carpi ulnaris tendinitis of uncertain cause.    I think the prognosis is good for spontaneous resolution of her symptoms.  She was instructed in activity restriction and use of her brace.  I also advised her in use of oral anti-inflammatory medications for a week to 10 days.    Return here in 2 weeks if her symptoms have not improved.    Diagnoses and all orders for this visit:    Numbness of left thumb    Left hand pain    Wrist pain, acute, left          PLAN    Return in about 2 weeks (around 7/1/2021).    Asif Forrester MD

## 2022-01-15 PROCEDURE — 87635 SARS-COV-2 COVID-19 AMP PRB: CPT | Performed by: NURSE PRACTITIONER

## 2023-09-03 RX ORDER — OFLOXACIN 3 MG/ML
10 SOLUTION AURICULAR (OTIC) 2 TIMES DAILY
Qty: 10 ML | Refills: 0 | Status: SHIPPED | OUTPATIENT
Start: 2023-09-03 | End: 2023-09-13

## 2023-09-03 RX ORDER — METHYLPREDNISOLONE 4 MG/1
1 TABLET ORAL DAILY
Qty: 21 TABLET | Refills: 0 | Status: SHIPPED | OUTPATIENT
Start: 2023-09-03